# Patient Record
Sex: FEMALE | Race: WHITE | URBAN - METROPOLITAN AREA
[De-identification: names, ages, dates, MRNs, and addresses within clinical notes are randomized per-mention and may not be internally consistent; named-entity substitution may affect disease eponyms.]

---

## 2024-08-12 ENCOUNTER — TELEPHONE (OUTPATIENT)
Age: 49
End: 2024-08-12

## 2024-08-12 NOTE — TELEPHONE ENCOUNTER
Patient Is calling back to schedule    NP francesca FLOWERS anthem [id number], notes in drawer 8.12.24

## 2024-08-13 NOTE — TELEPHONE ENCOUNTER
Patient Is calling back to schedule     NP francesca FLOWERS anthem [id number], notes in drawer 8.13.24

## 2024-08-14 NOTE — TELEPHONE ENCOUNTER
Patient Is calling back to schedule     NP francesca FLOWERS anthem [id number], notes in drawer 8.14.24